# Patient Record
(demographics unavailable — no encounter records)

---

## 2025-03-14 NOTE — PHYSICAL EXAM
[Well-appearing] : well-appearing [Normocephalic] : normocephalic [No dysmorphic facial features] : no dysmorphic facial features [No ocular abnormalities] : no ocular abnormalities [Neck supple] : neck supple [Lungs clear] : lungs clear [Heart sounds regular in rate and rhythm] : heart sounds regular in rate and rhythm [Soft] : soft [No organomegaly] : no organomegaly [No abnormal neurocutaneous stigmata or skin lesions] : no abnormal neurocutaneous stigmata or skin lesions [Straight] : straight [No juana or dimples] : no juana or dimples [No deformities] : no deformities [Alert] : alert [Well related, good eye contact] : well related, good eye contact [Conversant] : conversant [Normal speech and language] : normal speech and language [Follows instructions well] : follows instructions well [VFF] : VFF [Pupils reactive to light and accommodation] : pupils reactive to light and accommodation [Full extraocular movements] : full extraocular movements [Saccadic and smooth pursuits intact] : saccadic and smooth pursuits intact [No nystagmus] : no nystagmus [Normal facial sensation to light touch] : normal facial sensation to light touch [No facial asymmetry or weakness] : no facial asymmetry or weakness [Gross hearing intact] : gross hearing intact [Equal palate elevation] : equal palate elevation [Good shoulder shrug] : good shoulder shrug [Normal tongue movement] : normal tongue movement [Midline tongue, no fasciculations] : midline tongue, no fasciculations [Normal axial and appendicular muscle tone] : normal axial and appendicular muscle tone [Gets up on table without difficulty] : gets up on table without difficulty [No pronator drift] : no pronator drift [Normal finger tapping and fine finger movements] : normal finger tapping and fine finger movements [5/5 strength in proximal and distal muscles of arms and legs] : 5/5 strength in proximal and distal muscles of arms and legs [Walks and runs well] : walks and runs well [Able to walk on heels] : able to walk on heels [Able to walk on toes] : able to walk on toes [2+ biceps] : 2+ biceps [Triceps] : triceps [Knee jerks] : knee jerks [Localizes LT and temperature] : localizes LT and temperature [No dysmetria on FTNT] : no dysmetria on FTNT [Good walking balance] : good walking balance [Normal gait] : normal gait [Negative Romberg] : negative Romberg [de-identified] : Postural Left hand tremor, normal copying

## 2025-03-14 NOTE — ASSESSMENT
[FreeTextEntry1] : 14-year-old with history and exam consistent with asymmetric hand tremor.  Though essential tremors are in the differential the fact that these tremors are asymmetric warrants further workup at this time.  This includes assessment for both metabolic or structural causes of this tremor.  1 MRI of the brain to assess for cerebellar pathology  2 Lab work will be sent for CBC, CMP, CK, thyroid panel, vitamin studies, heavy metal

## 2025-03-14 NOTE — BIRTH HISTORY
[At ___ Weeks Gestation] : at [unfilled] weeks gestation [place] [Normal Vaginal Route] : by normal vaginal route [None] : there were no delivery complications [Age Appropriate] : age appropriate developmental milestones met [de-identified] : Induced

## 2025-03-14 NOTE — REVIEW OF SYSTEMS
[Normal] : Psychiatric [FreeTextEntry7] : Does not skip meals, not much vegetables, well hydrted [FreeTextEntry8] : Sleeps <6 hours overnight, recently naps

## 2025-03-14 NOTE — HISTORY OF PRESENT ILLNESS
[FreeTextEntry1] : Adia presents for evaluation of tremors.  She states in June of last year she began noticing mild shaking of the left greater than right hand.  Over the next few months the frequency of the shaking and intensity seems to have increased.  She denies any associated clumsiness such as dropping things when these tremors are occurring.  She does not feel that it is interfering with her fine motor movements.  She denies any extremity pain, numbness or tingling.  She also denies any neck pain or headache.  Tremors occur typically in the middle of the day and do not interfere with sleep.